# Patient Record
Sex: FEMALE | Race: WHITE | Employment: STUDENT | URBAN - METROPOLITAN AREA
[De-identification: names, ages, dates, MRNs, and addresses within clinical notes are randomized per-mention and may not be internally consistent; named-entity substitution may affect disease eponyms.]

---
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
## 2024-07-13 ENCOUNTER — V-VISIT (OUTPATIENT)
Dept: URGENT CARE | Age: 19
End: 2024-07-13

## 2024-07-13 VITALS
BODY MASS INDEX: 20.09 KG/M2 | HEIGHT: 66 IN | WEIGHT: 125 LBS | RESPIRATION RATE: 18 BRPM | TEMPERATURE: 98.2 F | OXYGEN SATURATION: 97 % | HEART RATE: 80 BPM | SYSTOLIC BLOOD PRESSURE: 114 MMHG | DIASTOLIC BLOOD PRESSURE: 76 MMHG

## 2024-07-13 DIAGNOSIS — J02.0 STREP PHARYNGITIS: Primary | ICD-10-CM

## 2024-07-13 LAB
INTERNAL PROCEDURAL CONTROLS ACCEPTABLE: YES
S PYO AG THROAT QL IA.RAPID: POSITIVE
TEST LOT EXPIRATION DATE: ABNORMAL
TEST LOT NUMBER: ABNORMAL

## 2024-07-13 RX ORDER — DROSPIRENONE, ETHINYL ESTRADIOL AND LEVOMEFOLATE CALCIUM AND LEVOMEFOLATE CALCIUM 3-0.02(24)
1 KIT ORAL DAILY
COMMUNITY

## 2024-07-13 RX ORDER — AZITHROMYCIN 500 MG/1
500 TABLET, FILM COATED ORAL DAILY
Qty: 5 TABLET | Refills: 0 | Status: SHIPPED | OUTPATIENT
Start: 2024-07-13 | End: 2024-07-18

## 2024-07-13 ASSESSMENT — ENCOUNTER SYMPTOMS
ABDOMINAL PAIN: 0
FEVER: 1
VOMITING: 0
COUGH: 0
RHINORRHEA: 0
SORE THROAT: 1
HEADACHES: 1
DIARRHEA: 0

## 2024-07-16 ENCOUNTER — TELEPHONE (OUTPATIENT)
Dept: FAMILY MEDICINE | Age: 19
End: 2024-07-16

## (undated) NOTE — ED AVS SNAPSHOT
THE [facility] in [facility] [address] [address]    Phone:  [phone]    Fax:  [address] [facility] Fallon Santos   MRN: [id number]    Department:  THE [facility] in [place]   Date of Visit:  3/19/2017           Diagn If you have any problems with your follow-up, please call our  at [phone]. Si usted tiene algun problema con denson sequimiento, por favor llame a nuestro adminstrador de casos al [phone].     Expect to receive an electronic reques Sharyn Quigley [address] ([address]) Shante Nava [id number]   [facility] [address] ([address] [facility]) [address]  (Do Sign Up Forms link in the Additional Information box on the right. [employer] Questions? Call [phone] for help. [employer] is NOT to be used for urgent needs. For medical emergencies, dial 911.

## (undated) NOTE — LETTER
[facility] IN [place]  [address] [address]  Dept: [phone]  Dept Fax: [phone]         March 19, 2017    Patient: Alyson Schumacher   YOB: 2005   Date of Visit: 3/19/2017       To Whom It May Concern:    Flaca Gambino

## (undated) NOTE — ED AVS SNAPSHOT
THE [facility] in [facility] [address] [address]    Phone:  [phone]    Fax:  [address] [facility] Fallon Santos   MRN: [id number]    Department:  THE [facility] in [place]   Date of Visit:  3/26/2017           Diagn If you have any problems with your follow-up, please call our  at [phone]. Si usted tiene algun problema con denson sequimiento, por favor llame a nuestro adminstrador de casos al [phone].     Expect to receive an electronic reques Sharyn [place] [address] ([address] [facility]) Shante Sadler [phone]   [facility] [address] ([address] [facility]) [address]  (Do Sign Up Forms link in the Additional Information box on the right. [email or web address]) Questions? Call [phone] for help. [email or web address] is NOT to be used for urgent needs. For medical emergencies, dial 911.